# Patient Record
Sex: MALE | Race: BLACK OR AFRICAN AMERICAN | NOT HISPANIC OR LATINO | Employment: UNEMPLOYED | URBAN - METROPOLITAN AREA
[De-identification: names, ages, dates, MRNs, and addresses within clinical notes are randomized per-mention and may not be internally consistent; named-entity substitution may affect disease eponyms.]

---

## 2023-04-14 ENCOUNTER — OFFICE VISIT (OUTPATIENT)
Dept: INTERNAL MEDICINE | Facility: CLINIC | Age: 23
End: 2023-04-14
Payer: COMMERCIAL

## 2023-04-14 ENCOUNTER — LAB (OUTPATIENT)
Dept: INTERNAL MEDICINE | Facility: CLINIC | Age: 23
End: 2023-04-14
Payer: COMMERCIAL

## 2023-04-14 ENCOUNTER — PATIENT ROUNDING (BHMG ONLY) (OUTPATIENT)
Dept: INTERNAL MEDICINE | Facility: CLINIC | Age: 23
End: 2023-04-14
Payer: COMMERCIAL

## 2023-04-14 VITALS
HEIGHT: 67 IN | OXYGEN SATURATION: 96 % | BODY MASS INDEX: 28.12 KG/M2 | SYSTOLIC BLOOD PRESSURE: 118 MMHG | RESPIRATION RATE: 20 BRPM | WEIGHT: 179.2 LBS | HEART RATE: 76 BPM | TEMPERATURE: 97.7 F | DIASTOLIC BLOOD PRESSURE: 80 MMHG

## 2023-04-14 DIAGNOSIS — Z13.29 SCREENING FOR ENDOCRINE DISORDER: ICD-10-CM

## 2023-04-14 DIAGNOSIS — Z00.00 ROUTINE GENERAL MEDICAL EXAMINATION AT A HEALTH CARE FACILITY: Primary | ICD-10-CM

## 2023-04-14 DIAGNOSIS — E66.3 OVERWEIGHT (BMI 25.0-29.9): ICD-10-CM

## 2023-04-14 DIAGNOSIS — Z00.00 ROUTINE GENERAL MEDICAL EXAMINATION AT A HEALTH CARE FACILITY: ICD-10-CM

## 2023-04-14 DIAGNOSIS — Z11.3 SCREEN FOR STD (SEXUALLY TRANSMITTED DISEASE): ICD-10-CM

## 2023-04-14 DIAGNOSIS — Z13.220 SCREENING, LIPID: ICD-10-CM

## 2023-04-14 LAB
ALBUMIN SERPL-MCNC: 5.1 G/DL (ref 3.5–5.2)
ALBUMIN/GLOB SERPL: 1.7 G/DL
ALP SERPL-CCNC: 92 U/L (ref 39–117)
ALT SERPL W P-5'-P-CCNC: 15 U/L (ref 1–41)
ANION GAP SERPL CALCULATED.3IONS-SCNC: 13.5 MMOL/L (ref 5–15)
AST SERPL-CCNC: 21 U/L (ref 1–40)
BILIRUB SERPL-MCNC: 1.1 MG/DL (ref 0–1.2)
BUN SERPL-MCNC: 14 MG/DL (ref 6–20)
BUN/CREAT SERPL: 14 (ref 7–25)
CALCIUM SPEC-SCNC: 10.2 MG/DL (ref 8.6–10.5)
CHLORIDE SERPL-SCNC: 102 MMOL/L (ref 98–107)
CHOLEST SERPL-MCNC: 143 MG/DL (ref 0–200)
CO2 SERPL-SCNC: 26.5 MMOL/L (ref 22–29)
CREAT SERPL-MCNC: 1 MG/DL (ref 0.76–1.27)
EGFRCR SERPLBLD CKD-EPI 2021: 107.1 ML/MIN/1.73
GLOBULIN UR ELPH-MCNC: 3 GM/DL
GLUCOSE SERPL-MCNC: 65 MG/DL (ref 65–99)
HAV IGM SERPL QL IA: NORMAL
HBV CORE IGM SERPL QL IA: NORMAL
HBV SURFACE AG SERPL QL IA: NORMAL
HCV AB SER DONR QL: NORMAL
HDLC SERPL-MCNC: 48 MG/DL (ref 40–60)
LDLC SERPL CALC-MCNC: 85 MG/DL (ref 0–100)
LDLC/HDLC SERPL: 1.79 {RATIO}
POTASSIUM SERPL-SCNC: 4.1 MMOL/L (ref 3.5–5.2)
PROT SERPL-MCNC: 8.1 G/DL (ref 6–8.5)
SODIUM SERPL-SCNC: 142 MMOL/L (ref 136–145)
TRIGL SERPL-MCNC: 46 MG/DL (ref 0–150)
TSH SERPL DL<=0.05 MIU/L-ACNC: 1.65 UIU/ML (ref 0.27–4.2)
VLDLC SERPL-MCNC: 10 MG/DL (ref 5–40)

## 2023-04-14 PROCEDURE — 1159F MED LIST DOCD IN RCRD: CPT | Performed by: PHYSICIAN ASSISTANT

## 2023-04-14 PROCEDURE — 87491 CHLMYD TRACH DNA AMP PROBE: CPT | Performed by: PHYSICIAN ASSISTANT

## 2023-04-14 PROCEDURE — 1160F RVW MEDS BY RX/DR IN RCRD: CPT | Performed by: PHYSICIAN ASSISTANT

## 2023-04-14 PROCEDURE — 87591 N.GONORRHOEAE DNA AMP PROB: CPT | Performed by: PHYSICIAN ASSISTANT

## 2023-04-14 PROCEDURE — 85025 COMPLETE CBC W/AUTO DIFF WBC: CPT | Performed by: PHYSICIAN ASSISTANT

## 2023-04-14 PROCEDURE — 3008F BODY MASS INDEX DOCD: CPT | Performed by: PHYSICIAN ASSISTANT

## 2023-04-14 PROCEDURE — 80074 ACUTE HEPATITIS PANEL: CPT | Performed by: PHYSICIAN ASSISTANT

## 2023-04-14 PROCEDURE — 84443 ASSAY THYROID STIM HORMONE: CPT | Performed by: PHYSICIAN ASSISTANT

## 2023-04-14 PROCEDURE — 87661 TRICHOMONAS VAGINALIS AMPLIF: CPT | Performed by: PHYSICIAN ASSISTANT

## 2023-04-14 PROCEDURE — 86592 SYPHILIS TEST NON-TREP QUAL: CPT | Performed by: PHYSICIAN ASSISTANT

## 2023-04-14 PROCEDURE — 99385 PREV VISIT NEW AGE 18-39: CPT | Performed by: PHYSICIAN ASSISTANT

## 2023-04-14 PROCEDURE — 80053 COMPREHEN METABOLIC PANEL: CPT | Performed by: PHYSICIAN ASSISTANT

## 2023-04-14 PROCEDURE — 80061 LIPID PANEL: CPT | Performed by: PHYSICIAN ASSISTANT

## 2023-04-14 PROCEDURE — 2014F MENTAL STATUS ASSESS: CPT | Performed by: PHYSICIAN ASSISTANT

## 2023-04-14 PROCEDURE — G0432 EIA HIV-1/HIV-2 SCREEN: HCPCS | Performed by: PHYSICIAN ASSISTANT

## 2023-04-14 NOTE — PROGRESS NOTES
A [employer] message has been sent to the patient for patient rounding with [facility].

## 2023-04-14 NOTE — PROGRESS NOTES
Chief Complaint  Annual Exam    Subjective          History of Present Illness  Ben Umana presents to [facility] for a routine physical.  History of Present Illness    Here for routine checkup, no concerns or complaints today.  Fhx of DMII in mom, GGM with stroke in 80s. No fhx of HTN or HLD.  No chronic illness, no hx of hospitalizations, no hx surgery.  Fracture in ankle and foot previously, no residual issues. Did see ortho for this.  Hx heart murmur when he was 9 yo, saw cardio at  and they had no concerns.  Hx of asthma/allergies when he was younger, had nebulizer when he was little but has not had to use nebulizer or inhaler for 15+ years    Diet/exercise: has a relatively healthy diet, tries to include fruits/vegi regularly. Avoids pop.  Eye exams: sees eye dr regularly, wears glasses, no recently vision changes.   Dental exams: sees dentist regularly    PHQ-2 Depression Screening  Little interest or pleasure in doing things? 0-->not at all   Feeling down, depressed, or hopeless? 0-->not at all   PHQ-2 Total Score 0       No LMP for male patient.   reports being sexually active and has had partner(s) who are female. He reports using the following method of birth control/protection: Condom.  Cancer-related family history is not on file.    Colonoscopy- never, no fhx of colon CA     reports that he quit smoking about 4 years ago. His smoking use included cigarettes. He has never used smokeless tobacco.     Health Maintenance   Topic Date Due   • COVID-19 Vaccine (1) 09/16/2023 (Originally 2000)   • INFLUENZA VACCINE  08/01/2023   • ANNUAL PHYSICAL  04/14/2024   • TDAP/TD VACCINES (4 - Td or Tdap) 07/16/2027   • HEPATITIS C SCREENING  Completed   • Pneumococcal Vaccine 0-64  Aged Out       Immunization History   Administered Date(s) Administered   • DTaP, Unspecified 1998, 05/18/1999   • Hep B, Adolescent or Pediatric 01/29/1999   • HiB 1998, 05/18/1999   •  "IPV 1999   • MMR 1999   • Meningococcal MCV4P (Menactra) 2009   • Tdap 2009, 2017, 2017   • Varicella 1999         The following portions of the patient's history were reviewed and updated as appropriate: allergies, current medications, past family history, past medical history, past social history, past surgical history and problem list.    Patient Active Problem List   Diagnosis   • Overweight (BMI 25.0-29.9)   • Routine general medical examination at a health care facility     Allergies   Allergen Reactions   • Penicillins Other (See Comments)     Swollen glands   • Shrimp Other (See Comments)     Swollen glands     No current outpatient medications on file.  No orders of the defined types were placed in this encounter.    Past Medical History:   Diagnosis Date   • Asthma due to environmental allergies    • Fractures    • Heart murmur    • Seasonal allergies       History reviewed. No pertinent surgical history.   Family History   Problem Relation Age of Onset   • Diabetes Mother       Social History     Socioeconomic History   • Marital status: Unknown   Tobacco Use   • Smoking status: Former     Types: Cigarettes     Quit date: 2018     Years since quittin.4   • Smokeless tobacco: Never   Vaping Use   • Vaping Use: Every day   Substance and Sexual Activity   • Alcohol use: Yes     Alcohol/week: 2.0 standard drinks     Types: 2 Standard drinks or equivalent per week     Comment: OCCASIONAL   • Drug use: Not Currently     Types: Marijuana     Comment: last usage:   • Sexual activity: Yes     Partners: Female     Birth control/protection: Condom        Objective   Vital Signs:   Vitals:    23 1119   BP: 118/80   Pulse: 76   Resp: 20   Temp: 97.7 °F (36.5 °C)   SpO2: 96%   Weight: 81.3 kg (179 lb 3.2 oz)   Height: 170.2 cm (67\")   PainSc: 0-No pain      Body mass index is 28.07 kg/m².  BMI is >= 25 and <30. (Overweight) The following options were offered " after discussion;: exercise counseling/recommendations and nutrition counseling/recommendations    Physical Exam  Vitals reviewed.   Constitutional:       General: He is not in acute distress.     Appearance: Normal appearance. He is not ill-appearing.   HENT:      Head: Normocephalic and atraumatic.      Right Ear: Tympanic membrane, ear canal and external ear normal.      Left Ear: Tympanic membrane, ear canal and external ear normal.      Mouth/Throat:      Mouth: Mucous membranes are moist.      Pharynx: No oropharyngeal exudate or posterior oropharyngeal erythema.   Eyes:      General: No scleral icterus.     Extraocular Movements: Extraocular movements intact.      Conjunctiva/sclera: Conjunctivae normal.      Pupils: Pupils are equal, round, and reactive to light.   Neck:      Thyroid: No thyromegaly.   Cardiovascular:      Rate and Rhythm: Normal rate and regular rhythm.      Pulses: Normal pulses.      Heart sounds: Normal heart sounds. No murmur heard.  Pulmonary:      Effort: Pulmonary effort is normal. No respiratory distress.      Breath sounds: Normal breath sounds. No stridor. No wheezing, rhonchi or rales.   Abdominal:      General: Bowel sounds are normal. There is no distension.      Palpations: Abdomen is soft. There is no mass.      Tenderness: There is no abdominal tenderness. There is no guarding.   Musculoskeletal:         General: No signs of injury. Normal range of motion.      Cervical back: Normal range of motion and neck supple.      Right lower leg: No edema.      Left lower leg: No edema.   Lymphadenopathy:      Cervical: No cervical adenopathy.   Skin:     General: Skin is warm and dry.      Coloration: Skin is not jaundiced.      Findings: No rash.   Neurological:      General: No focal deficit present.      Mental Status: He is alert and oriented to person, place, and time.      Gait: Gait normal.   Psychiatric:         Mood and Affect: Mood normal.         Behavior: Behavior normal.           Result Review :                   Assessment and Plan    Diagnoses and all orders for this visit:    1. Routine general medical examination at a health care facility (Primary)  Assessment & Plan:  Routine labs today    Orders:  -     Comprehensive Metabolic Panel; Future  -     Lipid Panel; Future  -     CBC Auto Differential; Future  -     TSH Rfx On Abnormal To Free T4; Future    2. Overweight (BMI 25.0-29.9)  Assessment & Plan:  Patient's (Body mass index is 28.07 kg/m².) indicates that they are overweight with health conditions that include none . Weight is unchanged. BMI is is above average; BMI management plan is completed. We discussed healthy diet and exercise      3. Screening, lipid  -     Lipid Panel; Future    4. Screening for endocrine disorder  -     TSH Rfx On Abnormal To Free T4; Future    5. Screen for STD (sexually transmitted disease)  -     HIV-1 / O / 2 Ag / Antibody 4th Generation; Future  -     Hepatitis Panel, Acute; Future  -     RPR; Future  -     Chlamydia trachomatis, Neisseria gonorrhoeae, Trichomonas vaginalis, PCR - Urine, Urine, Clean Catch; Future    UTD on vaccines, declined Covid vaccine.        Follow Up   Return in about 1 year (around 4/14/2024) for Yearly Physical.    Counseled on health maintenance topics and preventative care recommendations. Follow up yearly for routine physical exam. Diet and exercise counseling given. See dentist and eye doctor yearly as directed.    If a referral was made please contact our office if you have not heard about an appointment in the next 2 weeks.   If labs or images are ordered we will contact you with the results within the next week.  If you have not heard from us after a week please call our office to inquire about the results.    An Conteh PA-C    Patient was given instructions and counseling regarding his condition or for health maintenance advice. Please see specific information pulled into the AVS if appropriate.     *  Please note that portions of this note were completed with a voice recognition program.

## 2023-04-15 LAB
BASOPHILS # BLD AUTO: 0.02 10*3/MM3 (ref 0–0.2)
BASOPHILS NFR BLD AUTO: 0.3 % (ref 0–1.5)
DEPRECATED RDW RBC AUTO: 45.5 FL (ref 37–54)
EOSINOPHIL # BLD AUTO: 0.03 10*3/MM3 (ref 0–0.4)
EOSINOPHIL NFR BLD AUTO: 0.5 % (ref 0.3–6.2)
ERYTHROCYTE [DISTWIDTH] IN BLOOD BY AUTOMATED COUNT: 13.5 % (ref 12.3–15.4)
HCT VFR BLD AUTO: 45.1 % (ref 37.5–51)
HGB BLD-MCNC: 15.5 G/DL (ref 13–17.7)
HIV1+2 AB SER QL: NORMAL
IMM GRANULOCYTES # BLD AUTO: 0.01 10*3/MM3 (ref 0–0.05)
IMM GRANULOCYTES NFR BLD AUTO: 0.2 % (ref 0–0.5)
LYMPHOCYTES # BLD AUTO: 2.37 10*3/MM3 (ref 0.7–3.1)
LYMPHOCYTES NFR BLD AUTO: 41 % (ref 19.6–45.3)
MCH RBC QN AUTO: 32 PG (ref 26.6–33)
MCHC RBC AUTO-ENTMCNC: 34.4 G/DL (ref 31.5–35.7)
MCV RBC AUTO: 93 FL (ref 79–97)
MONOCYTES # BLD AUTO: 0.58 10*3/MM3 (ref 0.1–0.9)
MONOCYTES NFR BLD AUTO: 10 % (ref 5–12)
NEUTROPHILS NFR BLD AUTO: 2.77 10*3/MM3 (ref 1.7–7)
NEUTROPHILS NFR BLD AUTO: 48 % (ref 42.7–76)
NRBC BLD AUTO-RTO: 0 /100 WBC (ref 0–0.2)
PLATELET # BLD AUTO: 194 10*3/MM3 (ref 140–450)
PMV BLD AUTO: 11.4 FL (ref 6–12)
RBC # BLD AUTO: 4.85 10*6/MM3 (ref 4.14–5.8)
RPR SER QL: NORMAL
WBC NRBC COR # BLD: 5.78 10*3/MM3 (ref 3.4–10.8)

## 2023-04-16 PROBLEM — E66.3 OVERWEIGHT (BMI 25.0-29.9): Status: ACTIVE | Noted: 2023-04-16

## 2023-04-16 PROBLEM — Z00.00 ROUTINE GENERAL MEDICAL EXAMINATION AT A HEALTH CARE FACILITY: Status: ACTIVE | Noted: 2023-04-16

## 2023-04-17 LAB
C TRACH RRNA SPEC QL NAA+PROBE: NEGATIVE
N GONORRHOEA RRNA SPEC QL NAA+PROBE: NEGATIVE
T VAGINALIS RRNA SPEC QL NAA+PROBE: NEGATIVE

## 2023-04-19 ENCOUNTER — TELEPHONE (OUTPATIENT)
Dept: INTERNAL MEDICINE | Facility: CLINIC | Age: 23
End: 2023-04-19
Payer: COMMERCIAL

## 2023-04-19 NOTE — TELEPHONE ENCOUNTER
----- Message from An Conteh PA-C sent at 4/18/2023  8:20 AM EDT -----  Labs all looked good, the results are in acceptable ranges. STD screening is negative for chlamydia, gonorrhea, trichomonas, syphilis, HIV, hepatitis.

## 2023-11-30 ENCOUNTER — OFFICE VISIT (OUTPATIENT)
Dept: INTERNAL MEDICINE | Facility: CLINIC | Age: 23
End: 2023-11-30
Payer: COMMERCIAL

## 2023-11-30 ENCOUNTER — LAB (OUTPATIENT)
Dept: INTERNAL MEDICINE | Facility: CLINIC | Age: 23
End: 2023-11-30
Payer: COMMERCIAL

## 2023-11-30 VITALS
WEIGHT: 162.8 LBS | OXYGEN SATURATION: 98 % | TEMPERATURE: 96 F | SYSTOLIC BLOOD PRESSURE: 122 MMHG | DIASTOLIC BLOOD PRESSURE: 68 MMHG | BODY MASS INDEX: 25.55 KG/M2 | HEIGHT: 67 IN

## 2023-11-30 DIAGNOSIS — Z11.3 SCREEN FOR STD (SEXUALLY TRANSMITTED DISEASE): Primary | ICD-10-CM

## 2023-11-30 DIAGNOSIS — Z11.3 SCREEN FOR STD (SEXUALLY TRANSMITTED DISEASE): ICD-10-CM

## 2023-11-30 DIAGNOSIS — B35.6 JOCK ITCH: ICD-10-CM

## 2023-11-30 LAB
BILIRUB UR QL STRIP: NEGATIVE
CLARITY UR: CLEAR
COLOR UR: YELLOW
GLUCOSE UR STRIP-MCNC: NEGATIVE MG/DL
HGB UR QL STRIP.AUTO: NEGATIVE
HIV 1+2 AB+HIV1 P24 AG SERPL QL IA: NORMAL
KETONES UR QL STRIP: NEGATIVE
LEUKOCYTE ESTERASE UR QL STRIP.AUTO: NEGATIVE
NITRITE UR QL STRIP: NEGATIVE
PH UR STRIP.AUTO: 6.5 [PH] (ref 5–8)
PROT UR QL STRIP: NEGATIVE
SP GR UR STRIP: 1.03 (ref 1–1.03)
UROBILINOGEN UR QL STRIP: NORMAL

## 2023-11-30 PROCEDURE — 81001 URINALYSIS AUTO W/SCOPE: CPT | Performed by: STUDENT IN AN ORGANIZED HEALTH CARE EDUCATION/TRAINING PROGRAM

## 2023-11-30 PROCEDURE — 86592 SYPHILIS TEST NON-TREP QUAL: CPT | Performed by: STUDENT IN AN ORGANIZED HEALTH CARE EDUCATION/TRAINING PROGRAM

## 2023-11-30 PROCEDURE — G0432 EIA HIV-1/HIV-2 SCREEN: HCPCS | Performed by: STUDENT IN AN ORGANIZED HEALTH CARE EDUCATION/TRAINING PROGRAM

## 2023-11-30 PROCEDURE — 87491 CHLMYD TRACH DNA AMP PROBE: CPT | Performed by: STUDENT IN AN ORGANIZED HEALTH CARE EDUCATION/TRAINING PROGRAM

## 2023-11-30 PROCEDURE — 87591 N.GONORRHOEAE DNA AMP PROB: CPT | Performed by: STUDENT IN AN ORGANIZED HEALTH CARE EDUCATION/TRAINING PROGRAM

## 2023-11-30 PROCEDURE — 99213 OFFICE O/P EST LOW 20 MIN: CPT | Performed by: STUDENT IN AN ORGANIZED HEALTH CARE EDUCATION/TRAINING PROGRAM

## 2023-11-30 PROCEDURE — 36415 COLL VENOUS BLD VENIPUNCTURE: CPT | Performed by: STUDENT IN AN ORGANIZED HEALTH CARE EDUCATION/TRAINING PROGRAM

## 2023-11-30 RX ORDER — CLOTRIMAZOLE 1 %
1 CREAM (GRAM) TOPICAL 2 TIMES DAILY
Qty: 28 G | Refills: 2 | Status: SHIPPED | OUTPATIENT
Start: 2023-11-30

## 2023-11-30 NOTE — PROGRESS NOTES
Office Note     Name: Ben Umana    : 2000     MRN: [id number]     Chief Complaint  Rash (Itchy in groin  area ) and Exposure to STD (Possibility has been exposed and wants to be tested )    Subjective     History of Present Illness  Ben Umana is a 23 y.o. male who presents today for       Requesting STD check, had unprotected within the past 3 weeks.  He has symptoms of itching around his genital area, no dysuria, no hematuria, no discharge, no open lesions.  He used triamcinolone to relieve the itching, it improved, however the itching returned.     Review of Systems:   Review of Systems   All other systems reviewed and are negative.      Past Medical History:   Past Medical History:   Diagnosis Date    Asthma due to environmental allergies     Fractures     Heart murmur     Seasonal allergies        Past Surgical History: History reviewed. No pertinent surgical history.    Family History:   Family History   Problem Relation Age of Onset    Diabetes Mother        Social History:   Social History     Socioeconomic History    Marital status: Unknown   Tobacco Use    Smoking status: Former     Types: Cigarettes     Quit date: 2018     Years since quittin.1    Smokeless tobacco: Never   Vaping Use    Vaping Use: Every day   Substance and Sexual Activity    Alcohol use: Yes     Alcohol/week: 2.0 standard drinks of alcohol     Types: 2 Standard drinks or equivalent per week     Comment: OCCASIONAL    Drug use: Not Currently     Types: Marijuana     Comment: last usage:    Sexual activity: Yes     Partners: Female     Birth control/protection: Condom       Immunizations:   Immunization History   Administered Date(s) Administered    DTaP, Unspecified 1998, 1999    Hep B, Adolescent or Pediatric 1999    HiB 1998, 1999    IPV 1999    MMR 1999    Meningococcal MCV4P (Menactra) 2009    Tdap 2009, 2017, 2017    Varicella 1999  "       Medications:     Current Outpatient Medications:     clotrimazole (LOTRIMIN) 1 % cream, Apply 1 application  topically to the appropriate area as directed 2 (Two) Times a Day., Disp: 28 g, Rfl: 2  No current facility-administered medications for this visit.    Allergies:   Allergies   Allergen Reactions    Penicillins Other (See Comments)     Swollen glands    Shrimp Other (See Comments)     Swollen glands       Objective     Vital Signs  /68   Temp 96 °F (35.6 °C) (Temporal)   Ht 170.2 cm (67.01\")   Wt 73.8 kg (162 lb 12.8 oz)   SpO2 98%   BMI 25.49 kg/m²   Estimated body mass index is 25.49 kg/m² as calculated from the following:    Height as of this encounter: 170.2 cm (67.01\").    Weight as of this encounter: 73.8 kg (162 lb 12.8 oz).            Physical Exam  Constitutional:       Appearance: Normal appearance.   Cardiovascular:      Rate and Rhythm: Normal rate and regular rhythm.      Pulses: Normal pulses.      Heart sounds: Normal heart sounds.   Pulmonary:      Effort: Pulmonary effort is normal.      Breath sounds: Normal breath sounds.   Abdominal:      General: Abdomen is flat.      Palpations: Abdomen is soft.   Genitourinary:     Penis: Normal.       Testes: Normal.   Musculoskeletal:         General: Normal range of motion.   Skin:     General: Skin is warm.   Neurological:      Mental Status: He is alert.          Result Review :                  Assessment and Plan     1. Screen for STD (sexually transmitted disease)    - Urinalysis With Microscopic - Urine, Clean Catch; Future  - HIV-1/O/2 Ag/Ab w Reflex; Future  - RPR, Rfx Qn RPR / Confirm TP ([facility]); Future  - Chlamydia trachomatis, Neisseria gonorrhoeae, PCR - , Urine, Clean Catch; Future    2. Jock itch    - clotrimazole (LOTRIMIN) 1 % cream; Apply 1 application  topically to the appropriate area as directed 2 (Two) Times a Day.  Dispense: 28 g; Refill: 2       Follow Up  No follow-ups on file.    Monty Vieyra MD  [facility] " [address]  [facility]  [address]  [address] [address]  [phone]

## 2023-12-01 LAB
BACTERIA UR QL AUTO: NORMAL /HPF
HYALINE CASTS UR QL AUTO: NORMAL /LPF
MUCOUS THREADS URNS QL MICRO: NORMAL /HPF
RBC # UR STRIP: NORMAL /HPF
REF LAB TEST METHOD: NORMAL
SQUAMOUS #/AREA URNS HPF: NORMAL /HPF
WBC # UR STRIP: NORMAL /HPF

## 2023-12-02 LAB — RPR SER QL: NON REACTIVE

## 2023-12-04 LAB
C TRACH RRNA SPEC QL NAA+PROBE: NEGATIVE
N GONORRHOEA RRNA SPEC QL NAA+PROBE: NEGATIVE

## 2023-12-09 ENCOUNTER — HOSPITAL ENCOUNTER (EMERGENCY)
Facility: HOSPITAL | Age: 23
Discharge: HOME | End: 2023-12-09
Attending: EMERGENCY MEDICINE
Payer: COMMERCIAL

## 2023-12-09 VITALS
WEIGHT: 160 LBS | RESPIRATION RATE: 20 BRPM | TEMPERATURE: 97.8 F | OXYGEN SATURATION: 100 % | HEIGHT: 68 IN | DIASTOLIC BLOOD PRESSURE: 108 MMHG | BODY MASS INDEX: 24.25 KG/M2 | HEART RATE: 83 BPM | SYSTOLIC BLOOD PRESSURE: 145 MMHG

## 2023-12-09 DIAGNOSIS — K21.00 GASTROESOPHAGEAL REFLUX DISEASE WITH ESOPHAGITIS, UNSPECIFIED WHETHER HEMORRHAGE: Primary | ICD-10-CM

## 2023-12-09 DIAGNOSIS — F10.120 HANGOVER WITHOUT COMPLICATION: ICD-10-CM

## 2023-12-09 LAB
ALBUMIN SERPL-MCNC: 4.6 G/DL (ref 3.5–5.2)
ALBUMIN/GLOB SERPL: 1.4 G/DL
ALP SERPL-CCNC: 101 U/L (ref 39–117)
ALT SERPL W P-5'-P-CCNC: 15 U/L (ref 1–41)
ANION GAP SERPL CALCULATED.3IONS-SCNC: 23 MMOL/L (ref 5–15)
AST SERPL-CCNC: 29 U/L (ref 1–40)
BASOPHILS # BLD AUTO: 0.03 10*3/MM3 (ref 0–0.2)
BASOPHILS NFR BLD AUTO: 0.3 % (ref 0–1.5)
BILIRUB SERPL-MCNC: 1.2 MG/DL (ref 0–1.2)
BILIRUB UR QL STRIP: NEGATIVE
BUN SERPL-MCNC: 14 MG/DL (ref 6–20)
BUN/CREAT SERPL: 13.6 (ref 7–25)
CALCIUM SPEC-SCNC: 9.7 MG/DL (ref 8.6–10.5)
CHLORIDE SERPL-SCNC: 99 MMOL/L (ref 98–107)
CLARITY UR: CLEAR
CO2 SERPL-SCNC: 17 MMOL/L (ref 22–29)
COLOR UR: YELLOW
CREAT SERPL-MCNC: 1.03 MG/DL (ref 0.76–1.27)
DEPRECATED RDW RBC AUTO: 46.7 FL (ref 37–54)
EGFRCR SERPLBLD CKD-EPI 2021: 103.4 ML/MIN/1.73
EOSINOPHIL # BLD AUTO: 0.03 10*3/MM3 (ref 0–0.4)
EOSINOPHIL NFR BLD AUTO: 0.3 % (ref 0.3–6.2)
ERYTHROCYTE [DISTWIDTH] IN BLOOD BY AUTOMATED COUNT: 13.6 % (ref 12.3–15.4)
FLUAV RNA RESP QL NAA+PROBE: NOT DETECTED
FLUBV RNA RESP QL NAA+PROBE: NOT DETECTED
GLOBULIN UR ELPH-MCNC: 3.2 GM/DL
GLUCOSE SERPL-MCNC: 98 MG/DL (ref 65–99)
GLUCOSE UR STRIP-MCNC: NEGATIVE MG/DL
HCT VFR BLD AUTO: 40.8 % (ref 37.5–51)
HGB BLD-MCNC: 13.8 G/DL (ref 13–17.7)
HGB UR QL STRIP.AUTO: NEGATIVE
HOLD SPECIMEN: NORMAL
IMM GRANULOCYTES # BLD AUTO: 0.03 10*3/MM3 (ref 0–0.05)
IMM GRANULOCYTES NFR BLD AUTO: 0.3 % (ref 0–0.5)
KETONES UR QL STRIP: ABNORMAL
LEUKOCYTE ESTERASE UR QL STRIP.AUTO: NEGATIVE
LIPASE SERPL-CCNC: 16 U/L (ref 13–60)
LYMPHOCYTES # BLD AUTO: 2.58 10*3/MM3 (ref 0.7–3.1)
LYMPHOCYTES NFR BLD AUTO: 25.6 % (ref 19.6–45.3)
MCH RBC QN AUTO: 31.6 PG (ref 26.6–33)
MCHC RBC AUTO-ENTMCNC: 33.8 G/DL (ref 31.5–35.7)
MCV RBC AUTO: 93.4 FL (ref 79–97)
MONOCYTES # BLD AUTO: 0.76 10*3/MM3 (ref 0.1–0.9)
MONOCYTES NFR BLD AUTO: 7.5 % (ref 5–12)
NEUTROPHILS NFR BLD AUTO: 6.66 10*3/MM3 (ref 1.7–7)
NEUTROPHILS NFR BLD AUTO: 66 % (ref 42.7–76)
NITRITE UR QL STRIP: NEGATIVE
NRBC BLD AUTO-RTO: 0 /100 WBC (ref 0–0.2)
PH UR STRIP.AUTO: 5.5 [PH] (ref 5–8)
PLATELET # BLD AUTO: 165 10*3/MM3 (ref 140–450)
PMV BLD AUTO: 10.8 FL (ref 6–12)
POTASSIUM SERPL-SCNC: 3.1 MMOL/L (ref 3.5–5.2)
PROT SERPL-MCNC: 7.8 G/DL (ref 6–8.5)
PROT UR QL STRIP: ABNORMAL
RBC # BLD AUTO: 4.37 10*6/MM3 (ref 4.14–5.8)
SARS-COV-2 RNA RESP QL NAA+PROBE: NOT DETECTED
SODIUM SERPL-SCNC: 139 MMOL/L (ref 136–145)
SP GR UR STRIP: 1.03 (ref 1–1.03)
UROBILINOGEN UR QL STRIP: ABNORMAL
WBC NRBC COR # BLD AUTO: 10.09 10*3/MM3 (ref 3.4–10.8)
WHOLE BLOOD HOLD COAG: NORMAL
WHOLE BLOOD HOLD SPECIMEN: NORMAL

## 2023-12-09 PROCEDURE — 85025 COMPLETE CBC W/AUTO DIFF WBC: CPT

## 2023-12-09 PROCEDURE — 25810000003 SODIUM CHLORIDE 0.9 % SOLUTION

## 2023-12-09 PROCEDURE — 80053 COMPREHEN METABOLIC PANEL: CPT | Performed by: EMERGENCY MEDICINE

## 2023-12-09 PROCEDURE — 96374 THER/PROPH/DIAG INJ IV PUSH: CPT

## 2023-12-09 PROCEDURE — 25010000002 ONDANSETRON PER 1 MG

## 2023-12-09 PROCEDURE — 81003 URINALYSIS AUTO W/O SCOPE: CPT | Performed by: EMERGENCY MEDICINE

## 2023-12-09 PROCEDURE — 36415 COLL VENOUS BLD VENIPUNCTURE: CPT

## 2023-12-09 PROCEDURE — 87636 SARSCOV2 & INF A&B AMP PRB: CPT

## 2023-12-09 PROCEDURE — 99283 EMERGENCY DEPT VISIT LOW MDM: CPT

## 2023-12-09 PROCEDURE — 83690 ASSAY OF LIPASE: CPT | Performed by: EMERGENCY MEDICINE

## 2023-12-09 RX ORDER — ONDANSETRON 2 MG/ML
4 INJECTION INTRAMUSCULAR; INTRAVENOUS ONCE
Status: COMPLETED | OUTPATIENT
Start: 2023-12-09 | End: 2023-12-09

## 2023-12-09 RX ORDER — SODIUM CHLORIDE 9 MG/ML
10 INJECTION INTRAVENOUS AS NEEDED
Status: DISCONTINUED | OUTPATIENT
Start: 2023-12-09 | End: 2023-12-09 | Stop reason: HOSPADM

## 2023-12-09 RX ORDER — ALUMINA, MAGNESIA, AND SIMETHICONE 2400; 2400; 240 MG/30ML; MG/30ML; MG/30ML
30 SUSPENSION ORAL ONCE
Status: COMPLETED | OUTPATIENT
Start: 2023-12-09 | End: 2023-12-09

## 2023-12-09 RX ADMIN — ALUMINUM HYDROXIDE, MAGNESIUM HYDROXIDE, AND DIMETHICONE 30 ML: 400; 400; 40 SUSPENSION ORAL at 16:52

## 2023-12-09 RX ADMIN — SODIUM CHLORIDE 1000 ML: 9 INJECTION, SOLUTION INTRAVENOUS at 16:52

## 2023-12-09 RX ADMIN — ONDANSETRON 4 MG: 2 INJECTION INTRAMUSCULAR; INTRAVENOUS at 16:52

## 2023-12-09 NOTE — ED PROVIDER NOTES
Subjective   History of Present Illness patient is a 23-year-old male accompanied by his mother, reports awaking this morning, after having a night of drinking heavily, with severe sore throat, multiple episodes of vomiting, and reporting he was shaking all over.  he reports no previous abdominal surgical history, takes no prescription medicines, awake, alert, nontoxic-appearing.    Review of Systems   Constitutional: Negative.    HENT:  Positive for sore throat.    Respiratory: Negative.     Cardiovascular: Negative.    Gastrointestinal:  Positive for diarrhea, nausea and vomiting.   Genitourinary: Negative.    Musculoskeletal: Negative.    Skin: Negative.    Neurological: Negative.    Psychiatric/Behavioral: Negative.         Past Medical History:   Diagnosis Date   • Asthma due to environmental allergies    • Fractures    • Heart murmur    • Seasonal allergies        Allergies   Allergen Reactions   • Penicillins Other (See Comments)     Swollen glands   • Shrimp Other (See Comments)     Swollen glands       No past surgical history on file.    Family History   Problem Relation Age of Onset   • Diabetes Mother        Social History     Socioeconomic History   • Marital status: Unknown   Tobacco Use   • Smoking status: Former     Types: Cigarettes     Quit date: 2018     Years since quittin.1   • Smokeless tobacco: Never   Vaping Use   • Vaping Use: Every day   Substance and Sexual Activity   • Alcohol use: Yes     Alcohol/week: 2.0 standard drinks of alcohol     Types: 2 Standard drinks or equivalent per week     Comment: OCCASIONAL   • Drug use: Not Currently     Types: Marijuana     Comment: last usage:2018   • Sexual activity: Yes     Partners: Female     Birth control/protection: Condom           Objective   Physical Exam  Constitutional:       General: He is not in acute distress.     Appearance: Normal appearance. He is ill-appearing.   HENT:      Head: Normocephalic and atraumatic.      Right Ear:  External ear normal.      Left Ear: External ear normal.      Nose: Nose normal.      Mouth/Throat:      Mouth: Mucous membranes are dry.      Pharynx: Oropharynx is clear. Posterior oropharyngeal erythema present. No oropharyngeal exudate.   Eyes:      Extraocular Movements: Extraocular movements intact.      Conjunctiva/sclera: Conjunctivae normal.      Pupils: Pupils are equal, round, and reactive to light.   Cardiovascular:      Rate and Rhythm: Normal rate.      Pulses: Normal pulses.   Pulmonary:      Effort: Pulmonary effort is normal.      Breath sounds: Normal breath sounds.   Abdominal:      General: Abdomen is flat. Bowel sounds are normal.      Palpations: Abdomen is soft.      Tenderness: There is no abdominal tenderness.   Musculoskeletal:         General: Normal range of motion.      Cervical back: Normal range of motion.   Lymphadenopathy:      Cervical: Cervical adenopathy present.   Skin:     General: Skin is warm and dry.      Capillary Refill: Capillary refill takes less than 2 seconds.   Neurological:      General: No focal deficit present.      Mental Status: He is alert and oriented to person, place, and time.   Psychiatric:         Mood and Affect: Mood normal.         Behavior: Behavior normal.         Procedures           ED Course  ED Course as of 12/09/23 2259   Sat Dec 09, 2023   1441 Initially evaluated ED pit area approximately 2:30 PM [JH]   1747 Reevaluation patient approximately 1740 and explanation of disposition.  Patient verbalized understanding, as well as for symptomatic recommendations. [JH]      ED Course User Index  [JH] Scooter Banegas, JOSE                                             Medical Decision Making  Given patient's reported symptoms, differential diagnosis includes upper respiratory viral syndrome, including COVID 19, GERD, hiatal hernia, gastrointestinal viral syndrome, acute alcohol intoxication, electrolyte derangement, dehydration.  Patient will have serum  screening labs, urinalysis, rapid viral testing, evaluation with results to be communicated disposition considered.    Amount and/or Complexity of Data Reviewed  Labs: ordered.    Risk  Prescription drug management.        Final diagnoses:   Gastroesophageal reflux disease with esophagitis, unspecified whether hemorrhage   Hangover without complication       ED Disposition  ED Disposition       ED Disposition   Discharge    Condition   Stable    Comment   --               An Conteh, OTONIEL  [address] [id number] [phone]      As needed         Medication List      No changes were made to your prescriptions during this visit.            Scooter Banegas, APRN  12/09/23 [id number]

## 2024-09-27 ENCOUNTER — OFFICE VISIT (OUTPATIENT)
Dept: INTERNAL MEDICINE | Facility: CLINIC | Age: 24
End: 2024-09-27
Payer: COMMERCIAL

## 2024-09-27 VITALS
SYSTOLIC BLOOD PRESSURE: 118 MMHG | OXYGEN SATURATION: 99 % | HEART RATE: 60 BPM | HEIGHT: 68 IN | TEMPERATURE: 97.5 F | WEIGHT: 149.6 LBS | BODY MASS INDEX: 22.67 KG/M2 | DIASTOLIC BLOOD PRESSURE: 60 MMHG

## 2024-09-27 DIAGNOSIS — R21 RASH: Primary | ICD-10-CM

## 2024-09-27 DIAGNOSIS — J30.9 ALLERGIC RHINITIS, UNSPECIFIED SEASONALITY, UNSPECIFIED TRIGGER: ICD-10-CM

## 2024-09-27 PROCEDURE — 1126F AMNT PAIN NOTED NONE PRSNT: CPT | Performed by: PHYSICIAN ASSISTANT

## 2024-09-27 PROCEDURE — 1159F MED LIST DOCD IN RCRD: CPT | Performed by: PHYSICIAN ASSISTANT

## 2024-09-27 PROCEDURE — 1160F RVW MEDS BY RX/DR IN RCRD: CPT | Performed by: PHYSICIAN ASSISTANT

## 2024-09-27 PROCEDURE — 99213 OFFICE O/P EST LOW 20 MIN: CPT | Performed by: PHYSICIAN ASSISTANT

## 2024-09-27 RX ORDER — CLOTRIMAZOLE 1 %
1 CREAM (GRAM) TOPICAL 2 TIMES DAILY
Qty: 28 G | Refills: 0 | Status: SHIPPED | OUTPATIENT
Start: 2024-09-27

## 2024-09-27 RX ORDER — LORATADINE 10 MG/1
10 TABLET ORAL DAILY
Qty: 90 TABLET | Refills: 3 | Status: SHIPPED | OUTPATIENT
Start: 2024-09-27

## 2024-09-27 RX ORDER — BENZOCAINE/MENTHOL 6 MG-10 MG
1 LOZENGE MUCOUS MEMBRANE 2 TIMES DAILY
Qty: 28 G | Refills: 0 | Status: SHIPPED | OUTPATIENT
Start: 2024-09-27

## 2024-10-08 ENCOUNTER — PATIENT MESSAGE (OUTPATIENT)
Dept: INTERNAL MEDICINE | Facility: CLINIC | Age: 24
End: 2024-10-08
Payer: COMMERCIAL

## 2024-10-09 NOTE — PATIENT INSTRUCTIONS
Pityriasis Rosea  Pityriasis rosea is a rash that usually appears on the chest, abdomen, and back. It may also appear on the upper arms and upper legs. It usually begins as a single patch, and then more patches start to develop. The rash may cause mild itching, but it normally does not cause other problems. It usually goes away without treatment. However, it may take weeks or months for the rash to go away completely. The condition is not contagious. This means that it does not spread from person to person.  What are the causes?  The cause of this condition is not fully known. It may be caused by a virus. Human herpesviruses have been found in the rash, blood, and saliva of people who have pityriasis rosea.  What increases the risk?  This condition is more likely to develop in:  People aged 10-35 years.  Women.  Pregnant women.  It is more common in the spring and fall seasons.  What are the signs or symptoms?  The main symptom of this condition is a rash.  The rash usually begins with a single oval patch that is larger than the ones that follow. This is called a herald patch. It generally appears a week or more before the rest of the rash appears.  When more patches start to develop, they spread quickly on the chest, abdomen, back, arms, and legs. These patches are smaller than the first one.  The patches that make up the rash are usually oval-shaped and pink or red in color. They are usually flat but may sometimes be raised so that they can be felt with a finger. They may also be finely crinkled and have a scaly ring around the edge.  Some people may have mild itching and nonspecific symptoms, such as:  Nausea.  Loss of appetite.  Difficulty concentrating.  Headache.  Irritability.  Sore throat.  Mild fever.  How is this diagnosed?  This condition may be diagnosed based on:  Your medical history and a physical exam.  Tests to rule out other causes. This may include blood tests or a test in which a small sample of  skin is removed from the rash (biopsy) and checked in a lab.  How is this treated?         Treatment is not usually needed for this condition. The rash will often go away on its own in 4-8 weeks. In some cases, it may take 5 months or longer for you to fully recover. If treatment is needed, it may include:  An anti-itch lotion, such as hydrocortisone cream or an oral antihistamine.  An antiviral medicine called acyclovir. This is for severe cases.  In some cases UV therapy at a dermatologist's office is recommended.  Follow these instructions at home:  Take or apply over-the-counter and prescription medicines only as told by your health care provider.  Avoid scratching the affected areas of skin.  Do not take hot baths or use a sauna. Use only warm water when bathing or showering. Heat can increase itching. Add cornstarch to your bath to relieve the itching.  Avoid exposure to the sun and other sources of UV light, such as tanning beds, as told by your health care provider. UV light may help the rash go away but may cause unwanted changes in skin color.  Keep all follow-up visits.  Contact a health care provider if:  Your rash does not go away in 8 weeks.  Your rash gets much worse.  You have a fever.  You have swelling or pain in the rash area.  You have fluid, blood, or pus coming from the rash area.  Summary  Pityriasis rosea is a rash that usually appears on the chest, abdomen, and back. It can also appear on the upper arms and upper legs.  The rash usually begins with a single oval patch, called a herald patch,that appears before the rest of the rash appears. The herald patch is larger than the ones that follow.  The rash may cause mild itching, but it usually does not cause other problems. It usually goes away without treatment in 4-8 weeks.  In some cases, a health care provider may recommend or prescribe medicine to reduce itching.  This information is not intended to replace advice given to you by your  health care provider. Make sure you discuss any questions you have with your health care provider.  Document Revised: 01/17/2023 Document Reviewed: 01/17/2023  Elsevier Patient Education © 2024 [employer]

## 2024-10-10 ENCOUNTER — EDUCATION (OUTPATIENT)
Dept: INTERNAL MEDICINE | Facility: CLINIC | Age: 24
End: 2024-10-10
Payer: COMMERCIAL

## 2024-10-10 DIAGNOSIS — L42 PITYRIASIS ROSEA: Primary | ICD-10-CM

## 2024-10-15 ENCOUNTER — TELEPHONE (OUTPATIENT)
Dept: INTERNAL MEDICINE | Facility: CLINIC | Age: 24
End: 2024-10-15
Payer: COMMERCIAL

## 2024-10-15 NOTE — TELEPHONE ENCOUNTER
Pt mom called. She states his rash has spread all over his body.     He was seen on 9/27/2024 for this rash. Appt scheduled with 10/16/2024 with Dr. Fitzgerald

## 2024-10-16 ENCOUNTER — OFFICE VISIT (OUTPATIENT)
Dept: INTERNAL MEDICINE | Facility: CLINIC | Age: 24
End: 2024-10-16
Payer: COMMERCIAL

## 2024-10-16 VITALS
DIASTOLIC BLOOD PRESSURE: 74 MMHG | SYSTOLIC BLOOD PRESSURE: 116 MMHG | BODY MASS INDEX: 21.73 KG/M2 | OXYGEN SATURATION: 99 % | HEART RATE: 70 BPM | HEIGHT: 68 IN | WEIGHT: 143.4 LBS | TEMPERATURE: 97.1 F

## 2024-10-16 DIAGNOSIS — R21 RASH: Primary | ICD-10-CM

## 2024-10-16 PROCEDURE — 99214 OFFICE O/P EST MOD 30 MIN: CPT | Performed by: STUDENT IN AN ORGANIZED HEALTH CARE EDUCATION/TRAINING PROGRAM

## 2024-10-16 PROCEDURE — 1126F AMNT PAIN NOTED NONE PRSNT: CPT | Performed by: STUDENT IN AN ORGANIZED HEALTH CARE EDUCATION/TRAINING PROGRAM

## 2024-10-16 RX ORDER — HYDROXYZINE HYDROCHLORIDE 25 MG/1
25 TABLET, FILM COATED ORAL 3 TIMES DAILY PRN
Qty: 30 TABLET | Refills: 0 | Status: SHIPPED | OUTPATIENT
Start: 2024-10-16

## 2024-10-16 RX ORDER — BETAMETHASONE DIPROPIONATE 0.5 MG/G
CREAM TOPICAL
Qty: 45 G | Refills: 1 | Status: SHIPPED | OUTPATIENT
Start: 2024-10-16

## 2024-10-16 NOTE — ASSESSMENT & PLAN NOTE
Patient presenting for ongoing extremely itchy rash with development of new, smaller plaques. Patient has diffuse xerosis. Does not use any lotion. Recommended liberal use of fragrance-free lotion (CeraVe, Lubriderm, Aveeno). For itching, continue Claritin and start Atarax PRN. Recommended high-potency steroid ONLY to patches of skin, not diffusely. Recommended return to clinic in 1-2 weeks for follow-up. If not improving, biopsy at that time.

## 2024-10-16 NOTE — PROGRESS NOTES
Office Note     Name: Ben Umana    : 2000     MRN: [id number]     Chief Complaint  Rash (Was seen a couple weeks ago. Getting worse )    Subjective     History of Present Illness:  Ben Umana is a 24 y.o. male who presents today for rash.    Rash that started at the end of September. Getting more and more itchy. More spots keep popping up  Not itchy after bathing.   Soimetimes get a little red.   Itching keeping him up at night.   No recent viral infections.       Review of Systems:   Review of Systems    Past Medical History:   Past Medical History:   Diagnosis Date    Asthma due to environmental allergies     Fractures     Heart murmur     Pityriasis rosea 10/10/2024    Seasonal allergies        Past Surgical History: No past surgical history on file.    Family History:   Family History   Problem Relation Age of Onset    Diabetes Mother        Social History:   Social History     Socioeconomic History    Marital status: Unknown   Tobacco Use    Smoking status: Former     Current packs/day: 0.00     Average packs/day: 0.5 packs/day for 2.8 years (1.4 ttl pk-yrs)     Types: Cigarettes     Start date:      Quit date: 2018     Years since quittin.9    Smokeless tobacco: Never   Vaping Use    Vaping status: Former   Substance and Sexual Activity    Alcohol use: Not Currently     Alcohol/week: 2.0 standard drinks of alcohol     Types: 2 Standard drinks or equivalent per week     Comment: OCCASIONAL    Drug use: Not Currently     Types: Marijuana     Comment: last usage:    Sexual activity: Yes     Partners: Female     Birth control/protection: Condom       Immunizations:   Immunization History   Administered Date(s) Administered    DTaP, Unspecified 1998, 1999    Hep B, Adolescent or Pediatric 1999    HiB 1998, 1999    IPV 1999    MMR 1999    Meningococcal MCV4P (Menactra) 2009    Tdap 2009, 2017, 2017    Varicella  "06/21/1999        Medications:     Current Outpatient Medications:     clotrimazole (LOTRIMIN) 1 % cream, Apply 1 Application topically to the appropriate area as directed 2 (Two) Times a Day. For 2-4 weeks, Disp: 28 g, Rfl: 0    hydrocortisone 1 % cream, Apply 1 Application topically to the appropriate area as directed 2 (Two) Times a Day. For itching, Disp: 28 g, Rfl: 0    loratadine (Claritin) 10 MG tablet, Take 1 tablet by mouth Daily., Disp: 90 tablet, Rfl: 3    betamethasone dipropionate (DIPROSONE) 0.05 % cream, Apply only to areas where skin is raised. Apply twice daily., Disp: 45 g, Rfl: 1    hydrOXYzine (ATARAX) 25 MG tablet, Take 1 tablet by mouth 3 (Three) Times a Day As Needed for Itching., Disp: 30 tablet, Rfl: 0    Allergies:   Allergies   Allergen Reactions    Penicillins Other (See Comments)     Swollen glands    Shrimp Other (See Comments)     Swollen glands       Objective     Vital Signs  /74 (BP Location: Left arm, Patient Position: Sitting, Cuff Size: Adult)   Pulse 70   Temp 97.1 °F (36.2 °C) (Infrared)   Ht 172.7 cm (67.99\")   Wt 65 kg (143 lb 6.4 oz)   SpO2 99%   BMI 21.81 kg/m²   Estimated body mass index is 21.81 kg/m² as calculated from the following:    Height as of this encounter: 172.7 cm (67.99\").    Weight as of this encounter: 65 kg (143 lb 6.4 oz).    BMI is within normal parameters. No other follow-up for BMI required.       Physical Exam  Skin:     Comments: Diffuse erythema with scattered psoriaform vs lichenified plaques of varing size, ovid shape. No patches, vesicles. Extremely dry skin all over body.             Procedures     Results:  No results found for this or any previous visit (from the past 24 hours).     Assessment and Plan     Assessment/Plan:  Diagnoses and all orders for this visit:    1. Rash (Primary)  Assessment & Plan:  Patient presenting for ongoing extremely itchy rash with development of new, smaller plaques. Patient has diffuse xerosis. Does " not use any lotion. Recommended liberal use of fragrance-free lotion (CeraVe, Lubriderm, Aveeno). For itching, continue Claritin and start Atarax PRN. Recommended high-potency steroid ONLY to patches of skin, not diffusely. Recommended return to clinic in 1-2 weeks for follow-up. If not improving, biopsy at that time. Stop clotrimazole. Ddx include psoriasiform lesion, guttate psoriasis, pityriasis.       Other orders  -     hydrOXYzine (ATARAX) 25 MG tablet; Take 1 tablet by mouth 3 (Three) Times a Day As Needed for Itching.  Dispense: 30 tablet; Refill: 0  -     betamethasone dipropionate (DIPROSONE) 0.05 % cream; Apply only to areas where skin is raised. Apply twice daily.  Dispense: 45 g; Refill: 1        Follow Up  No follow-ups on file.    Azra Fitzgerald MD   [facility]  Answers submitted by the patient for this visit:  Primary Reason for Visit (Submitted on 10/16/2024)  What is the primary reason for your visit?: Rash  Rash Questionnaire (Submitted on 10/16/2024)  Chief Complaint: Rash  Chronicity: new  Onset: 1 to 4 weeks ago  Progression since onset: worsening  Affected locations: neck, chest, torso, back, abdomen, groin, genitalia, left axilla, left arm, left elbow, left hip, left buttock, left upper leg, left leg, right axilla, right arm, right elbow, right hip, right buttock, right upper leg, right leg  Characteristics: dryness, itchiness, scaling  Exposed to: unknown  anorexia: No  facial edema: No  joint pain: No  nail changes: No

## 2024-10-16 NOTE — PATIENT INSTRUCTIONS
Take Claritin daily.   Take hydroxyzine as needed for itching.   Apply strong steroid cream to areas of your skin where raised bumps are.     Follow-up in one week. If no improvement, return for biopsy.

## 2024-10-16 NOTE — ASSESSMENT & PLAN NOTE
Patient presenting for ongoing extremely itchy rash with development of new, smaller plaques. Patient has diffuse xerosis. Does not use any lotion. Recommended liberal use of fragrance-free lotion (CeraVe, Lubriderm, Aveeno). For itching, continue Claritin and start Atarax PRN. Recommended high-potency steroid ONLY to patches of skin, not diffusely. Recommended return to clinic in 1-2 weeks for follow-up. If not improving, biopsy at that time. Stop clotrimazole. Ddx include psoriasiform lesion, guttate psoriasis, pityriasis.